# Patient Record
Sex: MALE | Race: WHITE
[De-identification: names, ages, dates, MRNs, and addresses within clinical notes are randomized per-mention and may not be internally consistent; named-entity substitution may affect disease eponyms.]

---

## 2019-03-28 ENCOUNTER — HOSPITAL ENCOUNTER (OUTPATIENT)
Dept: HOSPITAL 47 - RADMAMWWP | Age: 79
Discharge: HOME | End: 2019-03-28
Attending: FAMILY MEDICINE
Payer: MEDICARE

## 2019-03-28 DIAGNOSIS — N64.4: Primary | ICD-10-CM

## 2019-03-28 PROCEDURE — 77066 DX MAMMO INCL CAD BI: CPT

## 2019-03-28 NOTE — MM
Reason for exam: clinical finding.



History:

Patient has history of colon cancer at age 67 and history of other cancer.



Physical Findings:

Nurse did not find any significant physical abnormalities on exam.



[facility] Diagnostic Mammo w CAD TRIXIE

Bilateral CC and MLO view(s) were taken.  XCCL view(s) were taken of the right 

breast.

The breast tissue is almost entirely fat.  No suspicious calcifications are seen.



These results were verbally communicated with the patient and result sheet given 

to the patient on 3/28/19.





ASSESSMENT: Incomplete: need additional imaging evaluation, BI-RAD 0



RECOMMENDATION:

Ultrasound of both breasts.

## 2019-03-28 NOTE — USB
Reason for exam: additional evaluation requested from abnormal screening.



History:

Patient has history of colon cancer at age 67 and history of other cancer.



US Breast BILAT

Right limited breast ultrasound including focal area of concern, retroareolar and 

axilla demonstrates a 0.6 x 0.4 x 0.6cm oval, solid, hyperechoic lesion at 8 

o'clock taller than wide.



Left limited breast ultrasound including focal area of concern, retroareolar and 

axilla demonstrates no cystic or solid lesion seen.



These results were verbally communicated with the patient and result sheet given 

to the patient on 3/28/19.





ASSESSMENT: Benign, BI-RAD 2



RECOMMENDATION:

Clinical management of both breasts.

Manage patient on a clinical basis.

## 2020-01-13 ENCOUNTER — HOSPITAL ENCOUNTER (OUTPATIENT)
Dept: HOSPITAL 47 - RADUSWWP | Age: 80
Discharge: HOME | End: 2020-01-13
Attending: FAMILY MEDICINE
Payer: MEDICARE

## 2020-01-13 DIAGNOSIS — I70.213: Primary | ICD-10-CM

## 2020-01-13 PROCEDURE — 93922 UPR/L XTREMITY ART 2 LEVELS: CPT

## 2020-01-15 NOTE — P.ARTDOP
Arterial Doppler





LOWER EXTREMITY ARTERIAL DOPPLER:





DATE OF SERVICE: 01/13/2020





Reason for study: Suspected PVD.





Doppler waveforms: Multiphasic bilaterally throughout.





Pulse volume recording: [].





Pressure gradients: None.





Ankle-brachial indices: Greater than 1 bilaterally.





Toe pressures: [] on the right, [] on the left





Impression: Normal study.

## 2020-04-12 ENCOUNTER — HOSPITAL ENCOUNTER (EMERGENCY)
Dept: HOSPITAL 47 - EC | Age: 80
Discharge: HOME | End: 2020-04-12
Payer: MEDICARE

## 2020-04-12 VITALS — HEART RATE: 89 BPM | SYSTOLIC BLOOD PRESSURE: 139 MMHG | TEMPERATURE: 98 F | DIASTOLIC BLOOD PRESSURE: 84 MMHG

## 2020-04-12 VITALS — RESPIRATION RATE: 18 BRPM

## 2020-04-12 DIAGNOSIS — S68.522A: Primary | ICD-10-CM

## 2020-04-12 DIAGNOSIS — Z88.0: ICD-10-CM
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
## 2020-04-12 DIAGNOSIS — Z23: ICD-10-CM

## 2020-04-12 DIAGNOSIS — Z87.891: ICD-10-CM

## 2020-04-12 PROCEDURE — 90471 IMMUNIZATION ADMIN: CPT

## 2020-04-12 PROCEDURE — 64450 NJX AA&/STRD OTHER PN/BRANCH: CPT

## 2020-04-12 PROCEDURE — 73140 X-RAY EXAM OF FINGER(S): CPT

## 2020-04-12 PROCEDURE — 99283 EMERGENCY DEPT VISIT LOW MDM: CPT

## 2020-04-12 PROCEDURE — 90715 TDAP VACCINE 7 YRS/> IM: CPT

## 2020-04-12 PROCEDURE — 96372 THER/PROPH/DIAG INJ SC/IM: CPT

## 2020-04-12 NOTE — ED
Wound/Laceration HPI





- General


Chief Complaint: Wound/Laceration


Stated Complaint: Cut of top of finger


Time Seen by Provider: 04/12/20 12:03


Source: patient, RN notes reviewed


Mode of arrival: ambulatory


Limitations: no limitations





- History of Present Illness


Initial Comments: 





80 patient -year-old male presents emergency Department chief complaint of left 

thumb laceration.  Patient stated to get his coat caught in a table saw states 

that many fell into a cutting his thumb.  He is unsure when his last tetanus 

was.  Patient has ALLERGIES to penicillin products.  Patient states that the 

distal tip is nearly cut off.  Patient states that he has MS doctor but 

primarily left-handed.  Patient denies any paresthesias of his other digits 

denies any pain proximal to the injury.





- Related Data


                                  Previous Rx's











 Medication  Instructions  Recorded


 


Cephalexin [Keflex] 500 mg PO Q6HR #40 cap 04/12/20


 


HYDROcodone/APAP 10-325MG [Norco 1 tab PO Q6HR PRN 3 Days #12 tab 04/12/20





]  











                                    Allergies











Allergy/AdvReac Type Severity Reaction Status Date / Time


 


Penicillins Allergy  Rash/Hives Verified 04/12/20 11:55














Review of Systems


ROS Statement: 


Those systems with pertinent positive or pertinent negative responses have been 

documented in the HPI.





ROS Other: All systems not noted in ROS Statement are negative.





Past Medical History


Past Medical History: Hypertension


Additional Past Medical History / Comment(s): borderline diabetic (not on 

medication)


History of Any Multi-Drug Resistant Organisms: None Reported


Additional Past Surgical History / Comment(s): colon resection


Past Psychological History: No Psychological Hx Reported


Smoking Status: Former smoker


Past Alcohol Use History: None Reported


Past Drug Use History: None Reported





General Exam


Limitations: no limitations


General appearance: alert, in no apparent distress


Head exam: Present: atraumatic, normocephalic, normal inspection


Respiratory exam: Present: normal lung sounds bilaterally.  Absent: respiratory 

distress, wheezes, rales, rhonchi, stridor


Cardiovascular Exam: Present: regular rate, normal rhythm, normal heart sounds. 

Absent: systolic murmur, diastolic murmur, rubs, gallop, clicks


Extremities exam: Present: other (Left thumb there is a laceration involves the 

complete distal tip past the interphalangeal joint, there is noted distal tip 

that is connected by less than 1 cm skin patient has movement proximal to the 

injury there is no active bleeding this time)





Course


                                   Vital Signs











  04/12/20





  11:56


 


Temperature 98.2 F


 


Pulse Rate 80


 


Respiratory 18





Rate 


 


Blood Pressure 144/80


 


O2 Sat by Pulse 98





Oximetry 














Procedures





- Procedures


Initial comment: 





(On digital block was performed, 10 mL of lidocaine 1% without epinephrine were 

used.  Adequate anesthesia, I did use scissors to remove remaining skin of the 

distal tip.  There is no active bleeding this was thoroughly rinsed, cleaned, 

dressing was applied.





Medical Decision Making





- Medical Decision Making





I did discuss the case with on-call orthopedics Dr. Huang who recommends the 

patient to have it thoroughly cleaned, antibiotic, tetanus the distal tip may be

removed, wrapped and will see hand surgeon tomorrow in office.  Patient does 

prefer to have the skin removed he does not prefer to have this surgically fixed

at this time.  Patient is comfortable with discharge and follow-up tomorrow.





Disposition


Clinical Impression: 


 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





Disposition: HOME SELF-CARE


Condition: Stable


Instructions (If sedation given, give patient instructions):  Finger Amputation 

(ED)


Additional Instructions: 


Please call orthopedics in the morning for follow-up tomorrow.  I did discuss 

the case with Dr. Huang in which he stated you will follow up with hand surgeon

tomorrow.Please return to the Emergency Department if symptoms worsen or any 

other concerns.


Prescriptions: 


Cephalexin [Keflex] 500 mg PO Q6HR #40 cap


HYDROcodone/APAP 10-325MG [Norco ] 1 tab PO Q6HR PRN 3 Days #12 tab


 PRN Reason: Pain


Is patient prescribed a controlled substance at d/c from ED?: Yes


When asked, does pt state using other controlled substances?: No


If prescribed controlled substance>3 days was MAPS reviewed?: Prescribed <3 Days


If opioid is for acute pain is fill amount 7 days or less?: Yes


If Rx opioid, was Start Talking consent form obtained?: Yes


Referrals: 


hCris Fish DO [Primary Care Provider] - 1-2 days


Karsten Huang MD [STAFF PHYSICIAN] - 1-2 days


Time of Disposition: 13:16

## 2020-04-12 NOTE — XR
EXAMINATION TYPE: XR finger LT , 3 VIEWS

 

DATE OF EXAM ORDERED: 4/12/2020

 

HISTORY: laceration.

 

COMPARISON: None.

 

FINDINGS:  There has been a partial amputation of the distal phalanx of the left thumb. Fracture is c
ompound comminuted.

 

There are also degenerative changes at the first carpal metacarpal joint.

 

IMPRESSION: 

 

COMPOUND, COMMINUTED FRACTURES OF THE DISTAL PHALANX OF THE LEFT THUMB.

 

 

 

CODE B: INITIAL ENCOUNTER FOR OPEN FRACTURE.

## 2020-04-14 ENCOUNTER — HOSPITAL ENCOUNTER (OUTPATIENT)
Dept: HOSPITAL 47 - OR | Age: 80
Discharge: HOME | End: 2020-04-14
Attending: ORTHOPAEDIC SURGERY
Payer: MEDICARE

## 2020-04-14 VITALS — RESPIRATION RATE: 18 BRPM

## 2020-04-14 VITALS — HEART RATE: 73 BPM | SYSTOLIC BLOOD PRESSURE: 125 MMHG | DIASTOLIC BLOOD PRESSURE: 63 MMHG

## 2020-04-14 VITALS — BODY MASS INDEX: 33 KG/M2

## 2020-04-14 VITALS — TEMPERATURE: 97.8 F

## 2020-04-14 DIAGNOSIS — Z90.49: ICD-10-CM

## 2020-04-14 DIAGNOSIS — S68.512A: Primary | ICD-10-CM

## 2020-04-14 DIAGNOSIS — Z79.82: ICD-10-CM

## 2020-04-14 DIAGNOSIS — I10: ICD-10-CM

## 2020-04-14 DIAGNOSIS — Z97.3: ICD-10-CM

## 2020-04-14 DIAGNOSIS — Z85.038: ICD-10-CM
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
## 2020-04-14 DIAGNOSIS — Z85.828: ICD-10-CM

## 2020-04-14 DIAGNOSIS — H91.90: ICD-10-CM

## 2020-04-14 DIAGNOSIS — E11.9: ICD-10-CM

## 2020-04-14 DIAGNOSIS — M06.9: ICD-10-CM

## 2020-04-14 DIAGNOSIS — Z79.899: ICD-10-CM

## 2020-04-14 DIAGNOSIS — Z87.891: ICD-10-CM

## 2020-04-14 DIAGNOSIS — Z82.49: ICD-10-CM

## 2020-04-14 DIAGNOSIS — Z88.0: ICD-10-CM

## 2020-04-14 LAB — GLUCOSE BLD-MCNC: 115 MG/DL (ref 75–99)

## 2020-04-14 PROCEDURE — 11044 DBRDMT BONE 1ST 20 SQ CM/<: CPT

## 2020-04-14 PROCEDURE — 14040 TIS TRNFR F/C/C/M/N/A/G/H/F: CPT

## 2020-04-14 PROCEDURE — 11750 EXCISION NAIL&NAIL MATRIX: CPT

## 2020-04-19 NOTE — P.OP
Date of Procedure: 04/14/20


Preoperative Diagnosis: 


Near-complete left thumb traumatic amputation (transphalangeal - distal phalanx)





Postoperative Diagnosis: 


Near-complete left thumb traumatic amputation (transphalangeal - distal phalanx)





Procedure(s) Performed: 


1.  Irrigation and sharp excisional debridement of near-complete left thumb 

traumatic amputation (transphalangeal - distal phalanx)


2.  Nail bed ablation - left thumb


3.  Left thumb wound closure with adjacent tissue transfer (~4 sq. cm)





Anesthesia: MAC, local


Surgeon: Kaden Gould


Estimated Blood Loss (ml): 2


Condition: stable


Disposition: PACU


Indications for Procedure: 


The patient is a pleasant 80-year-old male who sustained a traumatic amputation 

to the end of his left thumb after catching his clothing on something, causing 

him to stumble forward into a running circular saw.  Treatment options (and 

associated risks and benefits) were discussed in the office: Surgical 

debridement and wound closure were recommended. The patient expressed 

understanding and elected operative treatment.  In preop, additional questions 

were answered and the patient wished to proceed with surgery as planned. Consent

forms were signed. The surgical site was confirmed and marked preoperatively.





Description of Procedure: 


After consent was obtained, lidocaine with epinephrine was injected around the 

planned incision/surgical site in preop, using aseptic technique. After an 

appropriate interval of time, the patient was brought to the OR and positioned 

supine with the operative limb on a hand table. The left upper extremity was 

then prepped and draped in standard, sterile fashion. A time-out was performed, 

confirming patient identifiers, the operative side, the site and the procedure 

to be performed: all team members expressed agreement.





Loupe magnification was utilized throughout the case for optimum visualization. 


The traumatic wound was explored.  There was no gross purulence or foreign 

matter identified.  The saw had nearly amputated the nail plate, majority of the

nailbed/sterile matrix and the distal half of the distal phalanx.  This was 

reportedly dysvascular, held on only by a thin bridge of skin, and was resected 

by the ER at his initial evaluation.





Intraoperatively, there was a large remaining volar flap, involving the majority

of the pulp.  The terminal portions of the neurovascular bundles were identified

within the flap.  With the exception of a small arterial branch bleeder (which 

was controlled with bipolar cautery), they showed no obvious injury.  The EPL 

and FPL insertions were intact.





A sharp excisional debridement was performed, utilzing scalpels, scissors, 

curettes and rongeurs to resect devitalized/unhealthy-appearing tissues: this 

included skin, subcutaneous tissue, fat and bone. Resection proceeded until 

healthy-appearing tissues with bleeding edges were obtained.  The total area of 

debridement measured ~2 cm x 2 cm.    Hematoma and fibrous tissue were removed 

with curettes and rongeurs.  Devascularized, nonviable tissue at the periphery 

of the flap was sharply resected.  The exposed end of the distal phalanx was 

debrided with a curette and sharp edges of the bone were resected with a rongeur

back to smooth margins.





The eponychial fold was sharply elevated.  A small piece of remaining of nail 

plate was resected from the ulnar corner.  The residual germinal matrix was 

ablated with electrocautery.





The wound was copiously irrigated with normal saline using a bulb syringe and 

the surrounding soft tissues were gently debrided with curettes.





The traumatic wound was sharply extended along the midaxial lines. The volar 

flap was dissected off the flexor sheath.  Septations were divided and the flap 

was advanced in a Vero-type fashion to cover the end of the bone.  The tip of 

the flap was debulked and the skin edges were sharply revised and contoured to 

remove dogears and create a more rounded appearance.  The flap was sutured in 

place over the remaining distal phalanx with interrupted 5-0 nylon sutures.  The

wound closed completely, with no exposed bone, tendon or neurovascular 

structures.





Good hemostasis was maintained throughout the case without the need for a 

tourniquet. A soft, sterile dressing was applied, using Adaptic, 4 x 4s, 2-inch

Remy and Coban. All sponge, needle and instrument counts were correct at the 

end of the case. The patient tolerated the procedure well and was transferred to

recovery in stable condition.

## 2024-08-08 ENCOUNTER — HOSPITAL ENCOUNTER (EMERGENCY)
Dept: HOSPITAL 47 - EC | Age: 84
LOS: 1 days | Discharge: HOME | End: 2024-08-09
Payer: MEDICARE

## 2024-08-08 DIAGNOSIS — R42: Primary | ICD-10-CM

## 2024-08-08 PROCEDURE — 70450 CT HEAD/BRAIN W/O DYE: CPT

## 2024-08-08 PROCEDURE — 83735 ASSAY OF MAGNESIUM: CPT

## 2024-08-08 PROCEDURE — 93005 ELECTROCARDIOGRAM TRACING: CPT

## 2024-08-08 PROCEDURE — 85730 THROMBOPLASTIN TIME PARTIAL: CPT

## 2024-08-08 PROCEDURE — 84484 ASSAY OF TROPONIN QUANT: CPT

## 2024-08-08 PROCEDURE — 80053 COMPREHEN METABOLIC PANEL: CPT

## 2024-08-08 PROCEDURE — 85610 PROTHROMBIN TIME: CPT

## 2024-08-08 PROCEDURE — 99284 EMERGENCY DEPT VISIT MOD MDM: CPT

## 2024-09-16 NOTE — CT
EXAM:

CT Head Without Intravenous Contrast



CLINICAL HISTORY:

dizziness and whiplash



TECHNIQUE:

Axial computed tomography images of the head/brain without intravenous contrast.
CTDI is 49.2 mGy

and DLP is 1154.5 mGy-cm. This CT exam was performed using one or more of the 
following dose

reduction techniques: automated exposure control, adjustment of the mA and/or kV
according to

patient size, and/or use of iterative reconstruction technique.



COMPARISON:

No relevant prior studies available.



FINDINGS:

Brain:No hemorrhage, herniation, or mass effect. Chronic microvascular ischemic 
changes.

Ventricles:No hydrocephalus. Age related cerebral volume loss.

Bones/joints:Unremarkable.

Soft tissues:Unremarkable.

Sinuses:No air fluid levels. Moderate bilateral ethmoid and mild left 
maxillarysinus mucosal

thickening

Mastoid air cells:Clear.



IMPRESSION:

No acute hemorrhage, hydrocephalus, or mass effect.



Radiologist: Yoan Helton MD Electronically Signed: 08/08/24 01:10 Phone: 
[phone]

Study ready at 00:45 and initial results transmitted at 01:10

Results also transmitted to Film Room, Film Room @ [phone] (Fax)

[facility]